# Patient Record
Sex: FEMALE | Race: WHITE | Employment: FULL TIME
[De-identification: names, ages, dates, MRNs, and addresses within clinical notes are randomized per-mention and may not be internally consistent; named-entity substitution may affect disease eponyms.]

---

## 2024-10-29 ENCOUNTER — HOSPITAL ENCOUNTER (OUTPATIENT)
Facility: HOSPITAL | Age: 64
Setting detail: RECURRING SERIES
Discharge: HOME | End: 2024-11-01
Payer: COMMERCIAL

## 2024-10-29 PROCEDURE — 97535 SELF CARE MNGMENT TRAINING: CPT

## 2024-10-29 PROCEDURE — 97162 PT EVAL MOD COMPLEX 30 MIN: CPT

## 2024-10-29 PROCEDURE — 97112 NEUROMUSCULAR REEDUCATION: CPT

## 2024-10-29 NOTE — THERAPY EVALUATION
travel without restriction.      Frequency / Duration: Patient to be seen 1 times per week for 12 treatments.    Patient/ Caregiver education and instruction: Diagnosis, prognosis, self care, activity modification, and exercises   [x]  Plan of care has been reviewed with ROSIBEL Beth, PT       10/29/2024       1:53 PM    ===================================================================  I certify that the above Therapy Services are being furnished while the patient is under my care. I agree with the treatment plan and certify that this therapy is necessary.    Physician's Signature:_________________________   DATE:_________   TIME:________                           Sammie Bill MD    ** Signature, Date and Time must be completed for valid certification **  Please sign and fax to [phone].  Thank you

## 2024-11-04 ENCOUNTER — HOSPITAL ENCOUNTER (OUTPATIENT)
Facility: HOSPITAL | Age: 64
Setting detail: RECURRING SERIES
Discharge: HOME | End: 2024-11-07
Payer: COMMERCIAL

## 2024-11-04 PROCEDURE — 97112 NEUROMUSCULAR REEDUCATION: CPT

## 2024-11-04 PROCEDURE — 97535 SELF CARE MNGMENT TRAINING: CPT

## 2024-11-04 NOTE — PROGRESS NOTES
Care/Home Management (timed):  improve patient knowledge and understanding of home injury/symptom/pain management and activity modification  to improve patient's ability to progress to PLOF and address remaining functional goals.  (see flow sheet as applicable)     Details if applicable:  vagus nerve role/handout, bulking stool   46 46    Total Total     [x]  Patient Education billed concurrently with other procedures   [x] Review HEP    [] Progressed/Changed HEP, detail:    [] Other detail:       Other Objective/Functional Measures    Pain Level at end of session (0-10 scale): 0    Assessment     Good control of upper and lower TA activation without bulging. R side rib cage cramping. More difficulty with R expansion improved with overpressure and verbal coaching in SL over ball. Min verbal cueing for complete relaxation between reps of PPT. Pt demonstrates understanding of concepts discussed via verbal teach back.     Patient will continue to benefit from skilled PT / OT services to modify and progress therapeutic interventions, analyze and address functional mobility deficits, analyze and address ROM deficits, analyze and address soft tissue restrictions, analyze and cue for proper movement patterns, analyze and modify for postural abnormalities, and instruct in home and community integration to address functional deficits and attain remaining goals.    Progress toward goals / Updated goals:  []  See Progress Note/Recertification    Short Term Goals: To be accomplished in 4 treatments.  Patient will be independent with a progressive home exercise program without needed v.c. to promote return to general wellness program.- MET  Patient will tolerate internal PFM exam for analysis and establishment of appropriate intervention for remaining visits  Patient will be independent with bowel management strategies to decrease need for excessive wiping after defecation by at least 50% per subject report.      Long Term

## 2024-11-11 ENCOUNTER — HOSPITAL ENCOUNTER (OUTPATIENT)
Facility: HOSPITAL | Age: 64
Setting detail: RECURRING SERIES
Discharge: HOME | End: 2024-11-14
Payer: COMMERCIAL

## 2024-11-11 PROCEDURE — 97112 NEUROMUSCULAR REEDUCATION: CPT

## 2024-11-11 NOTE — PROGRESS NOTES
PHYSICAL THERAPY - MEDICARE DAILY TREATMENT NOTE (updated 3/23)    Date: 2024        Patient Name:  Kerrie Hinton :  1960   Medical   Diagnosis:  Fecal incontinence [R15.9] Treatment Diagnosis:  M62.89  OTHER SPECIFIED DISORDERS OF MUSCLE and N39.3    STRESS INCONTINENCE (FEMALE) (MALE)    Referral Source:  Sammie Bill MD Insurance:   Payor: [facility] / Plan: [facility] [employer] / Product Type: *No Product type* /                   Patient  verified yes     Visit #   Current  / Total 3 5   Time   In / Out 1249p 130p   Total Treatment Time 41   Total Timed Codes 41   1:1 Treatment Time 41      [facility] Totals Reminder:  bill using total billable   min of TIMED therapeutic procedures and modalities.   8-22 min = 1 unit; 23-37 min = 2 units; 38-52 min = 3 units; 53-67 min = 4 units; 68-82 min = 5 units        SUBJECTIVE    Pain Level (0-10 scale): 0    Any medication changes, allergies to medications, adverse drug reactions, diagnosis change, or new procedure performed?: [x] No    [] Yes (see summary sheet for update)  Medications: Verified on Patient Summary List    Subjective functional status/changes:       \"Everything's better\". The exercises are making her relax. Using vagus nerve handouts. Upped fiber- metamucil cookies, prunes. No episodes since last visit. Doing check ins. Trying to pay attention to stress/anxiety a lot.     OBJECTIVE    Therapeutic Procedures:  Tx Min Billable or 1:1 Min (if diff from Tx Min) Procedure, Rationale, Specifics   [phone] [id number] Neuromuscular Re-Education (timed):  improve balance, coordination, kinesthetic sense, posture, core stability and proprioception to improve patient's ability to develop conscious control of individual muscles and awareness of position of extremities in order to progress to PLOF and address remaining functional goals. (see flow sheet as applicable)     Details if applicable:       [id number] Self Care/Home Management (timed):  improve

## 2024-11-25 ENCOUNTER — HOSPITAL ENCOUNTER (OUTPATIENT)
Facility: HOSPITAL | Age: 64
Setting detail: RECURRING SERIES
Discharge: HOME | End: 2024-11-28
Payer: COMMERCIAL

## 2024-11-25 PROCEDURE — 97535 SELF CARE MNGMENT TRAINING: CPT

## 2024-11-25 PROCEDURE — 97112 NEUROMUSCULAR REEDUCATION: CPT

## 2024-11-25 NOTE — PROGRESS NOTES
applicable:     30 30 [id number] Self Care/Home Management (timed):  improve patient knowledge and understanding of home injury/symptom/pain management and activity modification  to improve patient's ability to progress to PLOF and address remaining functional goals.  (see flow sheet as applicable)     Details if applicable:  moisturizers review, perineal massage, magnesium suggestions, reassessment/prognosis   45 45    Total Total     [x]  Patient Education billed concurrently with other procedures   [x] Review HEP    [] Progressed/Changed HEP, detail:    [] Other detail:       Other Objective/Functional Measures    Pain Level at end of session (0-10 scale): 0    Assessment     Good control of all supine and quadruped progressions today. Pt demonstrates understanding of concepts discussed via verbal teach back. Plan to hold PT as needed considering significant progress at this point.     Patient will continue to benefit from skilled PT / OT services to modify and progress therapeutic interventions, analyze and address functional mobility deficits, analyze and address ROM deficits, analyze and address soft tissue restrictions, analyze and cue for proper movement patterns, analyze and modify for postural abnormalities, and instruct in home and community integration to address functional deficits and attain remaining goals.    Progress toward goals / Updated goals:  []  See Progress Note/Recertification    Short Term Goals: To be accomplished in 4 treatments.  Patient will be independent with a progressive home exercise program without needed v.c. to promote return to general wellness program.- MET  Patient will tolerate internal PFM exam for analysis and establishment of appropriate intervention for remaining visits- NT  Patient will be independent with bowel management strategies to decrease need for excessive wiping after defecation by at least 50% per subject report. - MET     Long Term Goals: To be accomplished in 12